# Patient Record
Sex: MALE | Race: WHITE | URBAN - METROPOLITAN AREA
[De-identification: names, ages, dates, MRNs, and addresses within clinical notes are randomized per-mention and may not be internally consistent; named-entity substitution may affect disease eponyms.]

---

## 2020-07-25 ENCOUNTER — TELEPHONE ENCOUNTER (OUTPATIENT)
Dept: URBAN - METROPOLITAN AREA CLINIC 13 | Facility: CLINIC | Age: 65
End: 2020-07-25

## 2020-07-26 ENCOUNTER — TELEPHONE ENCOUNTER (OUTPATIENT)
Dept: URBAN - METROPOLITAN AREA CLINIC 13 | Facility: CLINIC | Age: 65
End: 2020-07-26

## 2020-07-26 RX ORDER — MUPIROCIN 20 MG/G
OINTMENT TOPICAL
Qty: 22 | Refills: 0 | Status: ACTIVE | COMMUNITY
Start: 2012-02-08

## 2020-07-26 RX ORDER — FENOFIBRATE 160 MG/1
TABLET ORAL
Qty: 30 | Refills: 0 | Status: ACTIVE | COMMUNITY
Start: 2012-02-08

## 2020-07-26 RX ORDER — DEXLANSOPRAZOLE 60 MG/1
CAPSULE, DELAYED RELEASE ORAL
Qty: 30 | Refills: 0 | Status: ACTIVE | COMMUNITY
Start: 2012-01-24

## 2020-07-26 RX ORDER — ATORVASTATIN CALCIUM 40 MG/1
TABLET, FILM COATED ORAL
Qty: 30 | Refills: 0 | Status: ACTIVE | COMMUNITY
Start: 2012-05-17

## 2022-05-24 ENCOUNTER — OFFICE VISIT (OUTPATIENT)
Dept: URBAN - METROPOLITAN AREA CLINIC 113 | Facility: CLINIC | Age: 67
End: 2022-05-24
Payer: MEDICARE

## 2022-05-24 VITALS
HEART RATE: 91 BPM | TEMPERATURE: 97.5 F | WEIGHT: 148 LBS | DIASTOLIC BLOOD PRESSURE: 84 MMHG | RESPIRATION RATE: 18 BRPM | SYSTOLIC BLOOD PRESSURE: 138 MMHG | HEIGHT: 70 IN | BODY MASS INDEX: 21.19 KG/M2

## 2022-05-24 DIAGNOSIS — R13.19 ESOPHAGEAL DYSPHAGIA: ICD-10-CM

## 2022-05-24 DIAGNOSIS — K21.9 GASTROESOPHAGEAL REFLUX DISEASE, UNSPECIFIED WHETHER ESOPHAGITIS PRESENT: ICD-10-CM

## 2022-05-24 PROCEDURE — 99244 OFF/OP CNSLTJ NEW/EST MOD 40: CPT | Performed by: INTERNAL MEDICINE

## 2022-05-24 PROCEDURE — 99204 OFFICE O/P NEW MOD 45 MIN: CPT | Performed by: INTERNAL MEDICINE

## 2022-05-24 RX ORDER — HYDROCODONE BITARTRATE AND ACETAMINOPHEN 7.5; 325 MG/1; MG/1
1 TABLET AS NEEDED TABLET ORAL
Status: ACTIVE | COMMUNITY

## 2022-05-24 RX ORDER — DEXLANSOPRAZOLE 60 MG/1
CAPSULE, DELAYED RELEASE ORAL
Qty: 30 | Refills: 0 | Status: ACTIVE | COMMUNITY
Start: 2012-01-24

## 2022-05-24 RX ORDER — FENOFIBRATE 160 MG/1
TABLET ORAL
Qty: 30 | Refills: 0 | Status: ON HOLD | COMMUNITY
Start: 2012-02-08

## 2022-05-24 RX ORDER — PANTOPRAZOLE SODIUM 40 MG/1
1 TABLET TABLET, DELAYED RELEASE ORAL TWICE PER DAY
Qty: 60 | Refills: 3 | OUTPATIENT

## 2022-05-24 RX ORDER — ATORVASTATIN CALCIUM 40 MG/1
TABLET, FILM COATED ORAL
Qty: 30 | Refills: 0 | Status: ACTIVE | COMMUNITY
Start: 2012-05-17

## 2022-05-24 RX ORDER — MUPIROCIN 20 MG/G
OINTMENT TOPICAL
Qty: 22 | Refills: 0 | Status: ON HOLD | COMMUNITY
Start: 2012-02-08

## 2022-05-24 RX ORDER — CLOPIDOGREL 75 MG/1
1 TABLET TABLET, FILM COATED ORAL
Status: ACTIVE | COMMUNITY

## 2022-05-24 NOTE — HPI-TODAY'S VISIT:
The patient was referred by Dr. William Mireles for GERD .   A copy of this document is being forwarded to the referring provider.  66-year-old male presenting with a primary complaint of GERD. He is on Dexilant 60mg/d He has been getting worse GERD symptoms over the past year. He has "dealt with it". He has been taking Dexilant PRN. He does not take anything on a daily basis. He has worse symptoms when lying down. He has not had any weight loss. He does sometimes have dysphagia since his neck surgery. He has never had an EGD or colonoscopy.

## 2022-06-08 ENCOUNTER — CLAIMS CREATED FROM THE CLAIM WINDOW (OUTPATIENT)
Dept: URBAN - METROPOLITAN AREA CLINIC 4 | Facility: CLINIC | Age: 67
End: 2022-06-08
Payer: MEDICARE

## 2022-06-08 ENCOUNTER — OFFICE VISIT (OUTPATIENT)
Dept: URBAN - METROPOLITAN AREA SURGERY CENTER 25 | Facility: SURGERY CENTER | Age: 67
End: 2022-06-08
Payer: MEDICARE

## 2022-06-08 DIAGNOSIS — K29.50 CHRONIC GASTRITIS: ICD-10-CM

## 2022-06-08 DIAGNOSIS — K31.89 GASTRIC FOVEOLAR HYPERPLASIA: ICD-10-CM

## 2022-06-08 DIAGNOSIS — K31.A0 GASTRIC INTESTINAL METAPLASIA, UNSPECIFIED: ICD-10-CM

## 2022-06-08 DIAGNOSIS — R13.10 DYSPHAGIA: ICD-10-CM

## 2022-06-08 DIAGNOSIS — K31.89 OTHER DISEASES OF STOMACH AND DUODENUM: ICD-10-CM

## 2022-06-08 PROCEDURE — 88342 IMHCHEM/IMCYTCHM 1ST ANTB: CPT | Performed by: PATHOLOGY

## 2022-06-08 PROCEDURE — 43239 EGD BIOPSY SINGLE/MULTIPLE: CPT | Performed by: INTERNAL MEDICINE

## 2022-06-08 PROCEDURE — G8907 PT DOC NO EVENTS ON DISCHARG: HCPCS | Performed by: INTERNAL MEDICINE

## 2022-06-08 PROCEDURE — 88305 TISSUE EXAM BY PATHOLOGIST: CPT | Performed by: PATHOLOGY

## 2022-06-08 RX ORDER — CLOPIDOGREL 75 MG/1
1 TABLET TABLET, FILM COATED ORAL
Status: ACTIVE | COMMUNITY

## 2022-06-08 RX ORDER — PANTOPRAZOLE SODIUM 40 MG/1
1 TABLET TABLET, DELAYED RELEASE ORAL TWICE PER DAY
Qty: 60 | Refills: 3 | Status: ACTIVE | COMMUNITY

## 2022-06-08 RX ORDER — ATORVASTATIN CALCIUM 40 MG/1
TABLET, FILM COATED ORAL
Qty: 30 | Refills: 0 | Status: ACTIVE | COMMUNITY
Start: 2012-05-17

## 2022-06-08 RX ORDER — FENOFIBRATE 160 MG/1
TABLET ORAL
Qty: 30 | Refills: 0 | Status: ON HOLD | COMMUNITY
Start: 2012-02-08

## 2022-06-08 RX ORDER — MUPIROCIN 20 MG/G
OINTMENT TOPICAL
Qty: 22 | Refills: 0 | Status: ON HOLD | COMMUNITY
Start: 2012-02-08

## 2022-06-08 RX ORDER — DEXLANSOPRAZOLE 60 MG/1
CAPSULE, DELAYED RELEASE ORAL
Qty: 30 | Refills: 0 | Status: ACTIVE | COMMUNITY
Start: 2012-01-24

## 2022-06-08 RX ORDER — HYDROCODONE BITARTRATE AND ACETAMINOPHEN 7.5; 325 MG/1; MG/1
1 TABLET AS NEEDED TABLET ORAL
Status: ACTIVE | COMMUNITY

## 2022-06-17 PROBLEM — 40739000 DYSPHAGIA: Status: ACTIVE | Noted: 2022-06-17

## 2022-06-17 PROBLEM — 8493009 CHRONIC GASTRITIS: Status: ACTIVE | Noted: 2022-06-17

## 2022-06-29 ENCOUNTER — OFFICE VISIT (OUTPATIENT)
Dept: URBAN - METROPOLITAN AREA CLINIC 113 | Facility: CLINIC | Age: 67
End: 2022-06-29

## 2022-06-29 RX ORDER — HYDROCODONE BITARTRATE AND ACETAMINOPHEN 7.5; 325 MG/1; MG/1
1 TABLET AS NEEDED TABLET ORAL
Status: ON HOLD | COMMUNITY

## 2022-06-29 RX ORDER — PANTOPRAZOLE SODIUM 40 MG/1
1 TABLET TABLET, DELAYED RELEASE ORAL TWICE PER DAY
Qty: 60 | Refills: 3 | Status: ON HOLD | COMMUNITY

## 2022-06-29 RX ORDER — MUPIROCIN 20 MG/G
OINTMENT TOPICAL
Qty: 22 | Refills: 0 | Status: ON HOLD | COMMUNITY
Start: 2012-02-08

## 2022-06-29 RX ORDER — FENOFIBRATE 160 MG/1
TABLET ORAL
Qty: 30 | Refills: 0 | Status: ACTIVE | COMMUNITY
Start: 2012-02-08

## 2022-06-29 RX ORDER — DEXLANSOPRAZOLE 60 MG/1
CAPSULE, DELAYED RELEASE ORAL
Qty: 30 | Refills: 0 | Status: ACTIVE | COMMUNITY
Start: 2012-01-24

## 2022-06-29 RX ORDER — CLOPIDOGREL 75 MG/1
1 TABLET TABLET, FILM COATED ORAL
Status: ON HOLD | COMMUNITY

## 2022-06-29 RX ORDER — ATORVASTATIN CALCIUM 40 MG/1
TABLET, FILM COATED ORAL
Qty: 30 | Refills: 0 | Status: ON HOLD | COMMUNITY
Start: 2012-05-17

## 2022-08-22 ENCOUNTER — OFFICE VISIT (OUTPATIENT)
Dept: URBAN - METROPOLITAN AREA CLINIC 113 | Facility: CLINIC | Age: 67
End: 2022-08-22
Payer: MEDICARE

## 2022-08-22 ENCOUNTER — WEB ENCOUNTER (OUTPATIENT)
Dept: URBAN - METROPOLITAN AREA CLINIC 113 | Facility: CLINIC | Age: 67
End: 2022-08-22

## 2022-08-22 VITALS
HEART RATE: 67 BPM | WEIGHT: 147 LBS | BODY MASS INDEX: 21.05 KG/M2 | DIASTOLIC BLOOD PRESSURE: 67 MMHG | TEMPERATURE: 97.3 F | HEIGHT: 70 IN | RESPIRATION RATE: 14 BRPM | SYSTOLIC BLOOD PRESSURE: 133 MMHG

## 2022-08-22 DIAGNOSIS — Z12.11 COLON CANCER SCREENING: ICD-10-CM

## 2022-08-22 DIAGNOSIS — R13.19 ESOPHAGEAL DYSPHAGIA: ICD-10-CM

## 2022-08-22 DIAGNOSIS — K21.9 GASTROESOPHAGEAL REFLUX DISEASE, UNSPECIFIED WHETHER ESOPHAGITIS PRESENT: ICD-10-CM

## 2022-08-22 PROBLEM — 235595009: Status: ACTIVE | Noted: 2022-05-24

## 2022-08-22 PROCEDURE — 99213 OFFICE O/P EST LOW 20 MIN: CPT

## 2022-08-22 RX ORDER — DEXLANSOPRAZOLE 60 MG/1
CAPSULE, DELAYED RELEASE ORAL
OUTPATIENT
Start: 2012-01-24

## 2022-08-22 RX ORDER — MUPIROCIN 20 MG/G
OINTMENT TOPICAL
Qty: 22 | Refills: 0 | Status: ON HOLD | COMMUNITY
Start: 2012-02-08

## 2022-08-22 RX ORDER — PANTOPRAZOLE SODIUM 40 MG/1
1 TABLET TABLET, DELAYED RELEASE ORAL TWICE PER DAY
Qty: 60 | Refills: 3 | Status: ON HOLD | COMMUNITY

## 2022-08-22 RX ORDER — CLOPIDOGREL 75 MG/1
1 TABLET TABLET, FILM COATED ORAL ONCE A DAY
Status: ACTIVE | COMMUNITY

## 2022-08-22 RX ORDER — FENOFIBRATE 160 MG/1
TABLET ORAL
Qty: 30 | Refills: 0 | Status: ON HOLD | COMMUNITY
Start: 2012-02-08

## 2022-08-22 RX ORDER — ATORVASTATIN CALCIUM 40 MG/1
TABLET, FILM COATED ORAL
Qty: 30 | Refills: 0 | Status: ON HOLD | COMMUNITY
Start: 2012-05-17

## 2022-08-22 RX ORDER — CLOPIDOGREL 75 MG/1
1 TABLET TABLET, FILM COATED ORAL
Status: ON HOLD | COMMUNITY

## 2022-08-22 RX ORDER — HYDROCODONE BITARTRATE AND ACETAMINOPHEN 7.5; 325 MG/1; MG/1
1 TABLET AS NEEDED TABLET ORAL
Status: ACTIVE | COMMUNITY

## 2022-08-22 RX ORDER — DEXLANSOPRAZOLE 60 MG/1
CAPSULE, DELAYED RELEASE ORAL
Qty: 30 | Refills: 0 | Status: ACTIVE | COMMUNITY
Start: 2012-01-24

## 2022-08-22 RX ORDER — HYDROCODONE BITARTRATE AND ACETAMINOPHEN 7.5; 325 MG/1; MG/1
1 TABLET AS NEEDED TABLET ORAL
Status: ON HOLD | COMMUNITY

## 2022-08-22 NOTE — HPI-TODAY'S VISIT:
66-year-old male with a history of GERD, hyperlipidemia, and anticoagulation use presents for follow-up after EGD.  He was last seen on 5/24/2022 as a referral from Dr. William Mireles for GERD.  He reported worsening GERD over the past year despite as needed use of Dexilant.  Symptoms worsened at night, and he experienced occasional dysphagia following his neck surgery.  He was planned for an EGD with possible dilation and started on pantoprazole 40 mg twice daily.  He was asked to hold Plavix 7 days prior to procedure.  EGD 6/8/2022:Performed without difficulty.  No endoscopic esophageal abnormality to explain patient's dysphagia.  Duodenum was normal.  Gastritis was biopsied.  Pathology chronic inactive gastritis with changes suggestive of treated H pylori gastritis. Foveolar hyperplasia. No evidence of active H pylori.  Patient was diagnosed with left MCA CVA s/p emergent thrombectomy. He was started on ASA/Plavix and underwent ST/OT. His cardiologist is Dr. Thomas. Dysphagia and GERD is well controlled with Dexilant 60 mg once daily. He denies nausea, vomiting, abdominal pain. Bowel movements regular and without blood per rectum. He has never had a colonoscopy. No family history of colon cancer.

## 2022-09-07 NOTE — HPI-TODAY'S VISIT:
66-year-old male with a history of GERD, hyperlipidemia, and anticoagulation use presents for follow-up after EGD.  He was last seen on 5/24/2022 as a referral from Dr. William Mireles for GERD.  He reported worsening GERD over the past year despite as needed use of Dexilant.  Symptoms worsened at night and he experienced occasional dysphagia following his neck surgery.  He was planned for an EGD with possible dilation and started on pantoprazole 40 mg twice daily.  He was asked to hold Plavix 7 days prior to procedure. EGD 6/8/2022:Performed without difficulty.  No endoscopic esophageal abnormality to explain patient's dysphagia.  Duodenum was normal.  Gastritis was biopsied.  We do not have pathology.  Libtayo Counseling- I discussed with the patient the risks of Libtayo including but not limited to nausea, vomiting, diarrhea, and bone or muscle pain.  The patient verbalized understanding of the proper use and possible adverse effects of Libtayo.  All of the patient's questions and concerns were addressed.

## 2022-11-21 ENCOUNTER — ERX REFILL RESPONSE (OUTPATIENT)
Dept: URBAN - METROPOLITAN AREA CLINIC 107 | Facility: CLINIC | Age: 67
End: 2022-11-21

## 2022-11-21 RX ORDER — PANTOPRAZOLE SODIUM 40 MG/1
1 TABLET TABLET, DELAYED RELEASE ORAL TWICE PER DAY
Qty: 60 | Refills: 3 | OUTPATIENT

## 2022-11-21 RX ORDER — PANTOPRAZOLE SODIUM 40 MG/1
TAKE ONE TABLET BY MOUTH TWICE DAILY TABLET, DELAYED RELEASE ORAL
Qty: 60 TABLET | Refills: 3 | OUTPATIENT

## 2023-04-03 ENCOUNTER — OFFICE VISIT (OUTPATIENT)
Dept: URBAN - METROPOLITAN AREA CLINIC 113 | Facility: CLINIC | Age: 68
End: 2023-04-03
Payer: MEDICARE

## 2023-04-03 ENCOUNTER — WEB ENCOUNTER (OUTPATIENT)
Dept: URBAN - METROPOLITAN AREA CLINIC 113 | Facility: CLINIC | Age: 68
End: 2023-04-03

## 2023-04-03 VITALS
DIASTOLIC BLOOD PRESSURE: 75 MMHG | RESPIRATION RATE: 14 BRPM | SYSTOLIC BLOOD PRESSURE: 117 MMHG | TEMPERATURE: 97.3 F | WEIGHT: 125 LBS | HEART RATE: 66 BPM | BODY MASS INDEX: 17.9 KG/M2 | HEIGHT: 70 IN

## 2023-04-03 DIAGNOSIS — K86.9 PANCREATIC LESION: ICD-10-CM

## 2023-04-03 DIAGNOSIS — R63.4 UNINTENTIONAL WEIGHT LOSS: ICD-10-CM

## 2023-04-03 DIAGNOSIS — K21.9 GASTROESOPHAGEAL REFLUX DISEASE, UNSPECIFIED WHETHER ESOPHAGITIS PRESENT: ICD-10-CM

## 2023-04-03 DIAGNOSIS — R13.19 ESOPHAGEAL DYSPHAGIA: ICD-10-CM

## 2023-04-03 DIAGNOSIS — Z12.11 COLON CANCER SCREENING: ICD-10-CM

## 2023-04-03 DIAGNOSIS — R93.5 ABNORMAL CT OF THE ABDOMEN: ICD-10-CM

## 2023-04-03 PROCEDURE — 99214 OFFICE O/P EST MOD 30 MIN: CPT

## 2023-04-03 RX ORDER — MUPIROCIN 20 MG/G
OINTMENT TOPICAL
Qty: 22 | Refills: 0 | Status: ON HOLD | COMMUNITY
Start: 2012-02-08

## 2023-04-03 RX ORDER — ATORVASTATIN CALCIUM 40 MG/1
TABLET, FILM COATED ORAL
Qty: 30 | Refills: 0 | Status: ON HOLD | COMMUNITY
Start: 2012-05-17

## 2023-04-03 RX ORDER — PANTOPRAZOLE SODIUM 40 MG/1
TAKE ONE TABLET BY MOUTH TWICE DAILY TABLET, DELAYED RELEASE ORAL
Qty: 60 TABLET | Refills: 3 | Status: ACTIVE | COMMUNITY

## 2023-04-03 RX ORDER — DEXLANSOPRAZOLE 60 MG/1
CAPSULE, DELAYED RELEASE ORAL
OUTPATIENT

## 2023-04-03 RX ORDER — FENOFIBRATE 160 MG/1
TABLET ORAL
Qty: 30 | Refills: 0 | Status: ON HOLD | COMMUNITY
Start: 2012-02-08

## 2023-04-03 RX ORDER — HYDROCODONE BITARTRATE AND ACETAMINOPHEN 7.5; 325 MG/1; MG/1
1 TABLET AS NEEDED TABLET ORAL
Status: ON HOLD | COMMUNITY

## 2023-04-03 RX ORDER — CLOPIDOGREL 75 MG/1
1 TABLET TABLET, FILM COATED ORAL ONCE A DAY
Status: ACTIVE | COMMUNITY

## 2023-04-03 RX ORDER — DEXLANSOPRAZOLE 60 MG/1
CAPSULE, DELAYED RELEASE ORAL
Status: ACTIVE | COMMUNITY
Start: 2012-01-24

## 2023-04-03 RX ORDER — CLOPIDOGREL 75 MG/1
1 TABLET TABLET, FILM COATED ORAL
Status: ON HOLD | COMMUNITY

## 2023-04-03 RX ORDER — HYDROCODONE BITARTRATE AND ACETAMINOPHEN 7.5; 325 MG/1; MG/1
1 TABLET AS NEEDED TABLET ORAL
Status: ACTIVE | COMMUNITY

## 2023-04-03 NOTE — HPI-TODAY'S VISIT:
67-year-old male presents for follow-up.  He was last seen on 8/22/2022.  EGD performed for worsening GERD and intermittent dysphagia was unremarkable with exception of chronic inactive gastritis.  Symptoms had resolved with consistent use of Dexilant 60 mg once daily.  He was to continue this.  He was overdue for colon cancer screening as he had never had a colonoscopy.  However, he did recently have a stroke and a colonoscopy was deferred until least 6 months.  We would rediscuss at follow-up.   He presented to the ED in January 2023 with acute occlusion of the left popliteal artery and underwent angiographic thrombectomy on 1/20/2023.  He developed hypotension/shock postoperatively and a CT angiogram showed a large left retroperitoneal hematoma with active bleeding along the iliac muscle arising from the left deep circumflex iliac.  IR performed emergent coil embolization of the left deep circumflex iliac artery with successful cessation of the hemorrhage..  He required massive transfusion protocol 5 units of PRBC.  He developed a NSTEMI, ALBAN and acute hypoxic respiratory failure during the event.  Cardiology in consultation elected to perform heart catheterization which turned out to be absent of new disease findings.  CT angiogram abdomen/pelvis 1/202/2023: normal liver, gallbladder, and spleen. Focal 1.3 x 0.9 cm hyperenhancing focus within the distal pancreatic tail. Small hiatal hernia. Large left retroperitoneal hematoma with active hemorrhage along the ilias muscle, felt to arise from the branches of the left deep circumflex iliac artery. Diffuse aortoiliac atherosclerosis with severe stenosis of the distal left external iliac/proximal left common femoral artery just distal to endograft. There is complete reconstitution just distally at the common femoral artery stent. Infrarenal abdominal aortic aneurysm measuring up to 3 cm stable from prior exam. Tree-in-bud nodularity within the right lower love with endobronchial debris, new from prior exam, which may reflect chronic infectious/inflammatory process and/or aspiration.   He is followed by Dr. Thomas. He has not seen him since the hospitalization. He has 20-15 pounds since his hospitalization. His appetite is normal. He eats two meals per day, yet he is losing weight.  He is followed by pain management. He is to be started on a prescription for his opioid induced constipation. He is waiting for the prescription. He denies abdominal pain, nausea or vomiting. Denies fevers or chills. Denies blood per rectum or melena. Reflux is well controlled Dexilant 60 mg once daily.   8/22/22 66-year-old male with a history of GERD, hyperlipidemia, and anticoagulation use presents for follow-up after EGD.  He was last seen on 5/24/2022 as a referral from Dr. William Mireles for GERD.  He reported worsening GERD over the past year despite as needed use of Dexilant.  Symptoms worsened at night, and he experienced occasional dysphagia following his neck surgery.  He was planned for an EGD with possible dilation and started on pantoprazole 40 mg twice daily.  He was asked to hold Plavix 7 days prior to procedure.  EGD 6/8/2022:Performed without difficulty.  No endoscopic esophageal abnormality to explain patient's dysphagia.  Duodenum was normal.  Gastritis was biopsied.  Pathology chronic inactive gastritis with changes suggestive of treated H pylori gastritis. Foveolar hyperplasia. No evidence of active H pylori.  Patient was diagnosed with left MCA CVA s/p emergent thrombectomy. He was started on ASA/Plavix and underwent ST/OT. His cardiologist is Dr. Thomas. Dysphagia and GERD is well controlled with Dexilant 60 mg once daily. He denies nausea, vomiting, abdominal pain. Bowel movements regular and without blood per rectum. He has never had a colonoscopy. No family history of colon cancer.

## 2023-04-04 LAB
A/G RATIO: 1.5
ABSOLUTE BASOPHILS: 88
ABSOLUTE EOSINOPHILS: 440
ABSOLUTE LYMPHOCYTES: 3564
ABSOLUTE MONOCYTES: 651
ABSOLUTE NEUTROPHILS: 4057
ALBUMIN: 4
ALKALINE PHOSPHATASE: 63
ALT (SGPT): 7
AST (SGOT): 12
BASOPHILS: 1
BILIRUBIN, TOTAL: 0.7
BUN/CREATININE RATIO: (no result)
BUN: 7
C-REACTIVE PROTEIN, QUANT: 19.4
CALCIUM: 9.3
CARBON DIOXIDE, TOTAL: 33
CHLORIDE: 98
CREATININE: 0.77
EGFR: 99
EOSINOPHILS: 5
GLOBULIN, TOTAL: 2.7
GLUCOSE: 82
HEMATOCRIT: 39.8
HEMOGLOBIN: 13.8
LYMPHOCYTES: 40.5
MCH: 30.5
MCHC: 34.7
MCV: 87.9
MONOCYTES: 7.4
MPV: 11.5
NEUTROPHILS: 46.1
PLATELET COUNT: 199
POTASSIUM: 3
PROTEIN, TOTAL: 6.7
RDW: 15.2
RED BLOOD CELL COUNT: 4.53
SED RATE BY MODIFIED: 2
SODIUM: 138
WHITE BLOOD CELL COUNT: 8.8

## 2023-04-24 ENCOUNTER — TELEPHONE ENCOUNTER (OUTPATIENT)
Dept: URBAN - METROPOLITAN AREA CLINIC 113 | Facility: CLINIC | Age: 68
End: 2023-04-24

## 2023-04-28 PROBLEM — 70342003: Status: ACTIVE | Noted: 2023-04-28

## 2023-05-11 ENCOUNTER — OFFICE VISIT (OUTPATIENT)
Dept: URBAN - METROPOLITAN AREA CLINIC 113 | Facility: CLINIC | Age: 68
End: 2023-05-11
Payer: MEDICARE

## 2023-05-11 VITALS
HEIGHT: 70 IN | HEART RATE: 68 BPM | DIASTOLIC BLOOD PRESSURE: 58 MMHG | WEIGHT: 124.4 LBS | TEMPERATURE: 97.3 F | BODY MASS INDEX: 17.81 KG/M2 | SYSTOLIC BLOOD PRESSURE: 94 MMHG | RESPIRATION RATE: 18 BRPM

## 2023-05-11 DIAGNOSIS — R63.4 UNINTENTIONAL WEIGHT LOSS: ICD-10-CM

## 2023-05-11 DIAGNOSIS — R93.2 ABNORMAL FINDINGS ON DIAGNOSTIC IMAGING OF GALLBLADDER: ICD-10-CM

## 2023-05-11 DIAGNOSIS — K80.20 CALCULUS OF GALLBLADDER WITHOUT CHOLECYSTITIS WITHOUT OBSTRUCTION: ICD-10-CM

## 2023-05-11 DIAGNOSIS — K21.9 GASTROESOPHAGEAL REFLUX DISEASE, UNSPECIFIED WHETHER ESOPHAGITIS PRESENT: ICD-10-CM

## 2023-05-11 PROCEDURE — 99214 OFFICE O/P EST MOD 30 MIN: CPT

## 2023-05-11 RX ORDER — DEXLANSOPRAZOLE 60 MG/1
CAPSULE, DELAYED RELEASE ORAL
Status: ACTIVE | COMMUNITY

## 2023-05-11 RX ORDER — CLOPIDOGREL 75 MG/1
1 TABLET TABLET, FILM COATED ORAL
Status: ON HOLD | COMMUNITY

## 2023-05-11 RX ORDER — PANTOPRAZOLE SODIUM 40 MG/1
TAKE ONE TABLET BY MOUTH TWICE DAILY TABLET, DELAYED RELEASE ORAL
Qty: 60 TABLET | Refills: 3 | Status: ACTIVE | COMMUNITY

## 2023-05-11 RX ORDER — CLOPIDOGREL 75 MG/1
1 TABLET TABLET, FILM COATED ORAL ONCE A DAY
Status: ACTIVE | COMMUNITY

## 2023-05-11 RX ORDER — MUPIROCIN 20 MG/G
OINTMENT TOPICAL
Qty: 22 | Refills: 0 | Status: ON HOLD | COMMUNITY
Start: 2012-02-08

## 2023-05-11 RX ORDER — HYDROCODONE BITARTRATE AND ACETAMINOPHEN 7.5; 325 MG/1; MG/1
1 TABLET AS NEEDED TABLET ORAL
Status: ACTIVE | COMMUNITY

## 2023-05-11 RX ORDER — HYDROCODONE BITARTRATE AND ACETAMINOPHEN 7.5; 325 MG/1; MG/1
1 TABLET AS NEEDED TABLET ORAL
Status: ON HOLD | COMMUNITY

## 2023-05-11 RX ORDER — FENOFIBRATE 160 MG/1
TABLET ORAL
Qty: 30 | Refills: 0 | Status: ON HOLD | COMMUNITY
Start: 2012-02-08

## 2023-05-11 RX ORDER — ATORVASTATIN CALCIUM 40 MG/1
TABLET, FILM COATED ORAL
Qty: 30 | Refills: 0 | Status: ON HOLD | COMMUNITY
Start: 2012-05-17

## 2023-05-11 RX ORDER — DEXLANSOPRAZOLE 60 MG/1
CAPSULE, DELAYED RELEASE ORAL
OUTPATIENT

## 2023-05-11 NOTE — HPI-TODAY'S VISIT:
Review 67-year-old male presents for short interval follow-up.  He was last seen on 4/3/2023.  He is due for screening colonoscopy however this was originally deferred due to prior CVA in 2022.  He had since been hospitalized for popliteal artery clotting followed by retroperitoneal hematoma status postembolization.  This was complicated by NSTEMI and ALBAN.  Given his recent cardiovascular events he was again referred for colonoscopy for at least 3 to 6 months.  Cardiology follow-up was encouraged.  CT angiogram was notable for pancreatic lesion.  Given his unintentional weight loss and findings he was planned for an MRI.  Based on these findings would consider upper GI series.  EGD again is not recommended given comorbidities.  Labs 4/3/2023:Normal sed rate, CRP 19.4, potassium 3, normal LFTs, unremarkable CBC.  MRI of the abdomen with and without contrast 4/18/2023:No evidence for pancreatic mass.  Cholelithiasis with mild gallbladder wall thickening measuring just over 3 mm and mild dilatation of the biliary tree without evidence of choledocholithiasis.  Recommend correlation with liver function test.  There is a 99 x 72 mm complex collection in the left lower retroperitoneum lateral to the psoas muscle.  It is T1 shortening with no evidence of enhancement is most consistent with a hematoma.  Correlate clinically.  Small right pleural effusion.  Mild enlargement of the infrarenal aorta measuring 31 mm.  He was recommended to follow-up with his primary care regarding the large hematoma.  Regarding his gallstone findings, most recent LFTs were normal though he was asked if he had any right upper quadrant pain.  He admitted to intermittent right upper quadrant pain sometimes associated with food.  He was referred to Dr. Lopez to determine candidacy for surgery.   He is drinking boost shakes 4-5 times per day. He was switched from Paxil to Lexapro. He did have nausea during today's visit. He is not sure if this is from Paxil. He has not had nausea issues his last visit. He denies abdominal pain. Denies diarrhea or rectal bleeding. His appetite is healthy. He is not sure why he is not gaining weight. He did see Dr. Lopez yesterday who did not feel he was a candidate for surgery at this time.  4/3/2023 67-year-old male presents for follow-up.  He was last seen on 8/22/2022.  EGD performed for worsening GERD and intermittent dysphagia was unremarkable with exception of chronic inactive gastritis.  Symptoms had resolved with consistent use of Dexilant 60 mg once daily.  He was to continue this.  He was overdue for colon cancer screening as he had never had a colonoscopy.  However, he did recently have a stroke and a colonoscopy was deferred until least 6 months.  We would rediscuss at follow-up.   He presented to the ED in January 2023 with acute occlusion of the left popliteal artery and underwent angiographic thrombectomy on 1/20/2023.  He developed hypotension/shock postoperatively and a CT angiogram showed a large left retroperitoneal hematoma with active bleeding along the iliac muscle arising from the left deep circumflex iliac.  IR performed emergent coil embolization of the left deep circumflex iliac artery with successful cessation of the hemorrhage..  He required massive transfusion protocol 5 units of PRBC.  He developed a NSTEMI, ALBAN and acute hypoxic respiratory failure during the event.  Cardiology in consultation elected to perform heart catheterization which turned out to be absent of new disease findings.  CT angiogram abdomen/pelvis 1/202/2023: normal liver, gallbladder, and spleen. Focal 1.3 x 0.9 cm hyperenhancing focus within the distal pancreatic tail. Small hiatal hernia. Large left retroperitoneal hematoma with active hemorrhage along the ilias muscle, felt to arise from the branches of the left deep circumflex iliac artery. Diffuse aortoiliac atherosclerosis with severe stenosis of the distal left external iliac/proximal left common femoral artery just distal to endograft. There is complete reconstitution just distally at the common femoral artery stent. Infrarenal abdominal aortic aneurysm measuring up to 3 cm stable from prior exam. Tree-in-bud nodularity within the right lower love with endobronchial debris, new from prior exam, which may reflect chronic infectious/inflammatory process and/or aspiration.   He is followed by Dr. Thomas. He has not seen him since the hospitalization. He has 20-15 pounds since his hospitalization. His appetite is normal. He eats two meals per day, yet he is losing weight.  He is followed by pain management. He is to be started on a prescription for his opioid induced constipation. He is waiting for the prescription. He denies abdominal pain, nausea or vomiting. Denies fevers or chills. Denies blood per rectum or melena. Reflux is well controlled Dexilant 60 mg once daily.   8/22/22 66-year-old male with a history of GERD, hyperlipidemia, and anticoagulation use presents for follow-up after EGD.  He was last seen on 5/24/2022 as a referral from Dr. William Mireles for GERD.  He reported worsening GERD over the past year despite as needed use of Dexilant.  Symptoms worsened at night, and he experienced occasional dysphagia following his neck surgery.  He was planned for an EGD with possible dilation and started on pantoprazole 40 mg twice daily.  He was asked to hold Plavix 7 days prior to procedure.  EGD 6/8/2022:Performed without difficulty.  No endoscopic esophageal abnormality to explain patient's dysphagia.  Duodenum was normal.  Gastritis was biopsied.  Pathology chronic inactive gastritis with changes suggestive of treated H pylori gastritis. Foveolar hyperplasia. No evidence of active H pylori.  Patient was diagnosed with left MCA CVA s/p emergent thrombectomy. He was started on ASA/Plavix and underwent ST/OT. His cardiologist is Dr. Thomas. Dysphagia and GERD is well controlled with Dexilant 60 mg once daily. He denies nausea, vomiting, abdominal pain. Bowel movements regular and without blood per rectum. He has never had a colonoscopy. No family history of colon cancer.

## 2023-07-03 ENCOUNTER — OFFICE VISIT (OUTPATIENT)
Dept: URBAN - METROPOLITAN AREA CLINIC 113 | Facility: CLINIC | Age: 68
End: 2023-07-03

## 2023-07-03 ENCOUNTER — OFFICE VISIT (OUTPATIENT)
Dept: URBAN - METROPOLITAN AREA CLINIC 113 | Facility: CLINIC | Age: 68
End: 2023-07-03
Payer: MEDICARE

## 2023-07-03 VITALS
SYSTOLIC BLOOD PRESSURE: 105 MMHG | BODY MASS INDEX: 18.27 KG/M2 | WEIGHT: 127.6 LBS | TEMPERATURE: 97.1 F | DIASTOLIC BLOOD PRESSURE: 61 MMHG | RESPIRATION RATE: 18 BRPM | HEART RATE: 77 BPM | HEIGHT: 70 IN

## 2023-07-03 DIAGNOSIS — R63.4 UNINTENTIONAL WEIGHT LOSS: ICD-10-CM

## 2023-07-03 DIAGNOSIS — R10.11 RUQ PAIN: ICD-10-CM

## 2023-07-03 DIAGNOSIS — K86.9 PANCREATIC LESION: ICD-10-CM

## 2023-07-03 DIAGNOSIS — K80.20 CALCULUS OF GALLBLADDER WITHOUT CHOLECYSTITIS WITHOUT OBSTRUCTION: ICD-10-CM

## 2023-07-03 DIAGNOSIS — Z12.11 COLON CANCER SCREENING: ICD-10-CM

## 2023-07-03 DIAGNOSIS — R93.5 ABNORMAL CT OF THE ABDOMEN: ICD-10-CM

## 2023-07-03 DIAGNOSIS — R13.19 ESOPHAGEAL DYSPHAGIA: ICD-10-CM

## 2023-07-03 DIAGNOSIS — K21.9 GASTROESOPHAGEAL REFLUX DISEASE, UNSPECIFIED WHETHER ESOPHAGITIS PRESENT: ICD-10-CM

## 2023-07-03 DIAGNOSIS — K82.8 THICKENING OF WALL OF GALLBLADDER: ICD-10-CM

## 2023-07-03 DIAGNOSIS — R93.2 ABNORMAL FINDINGS ON DIAGNOSTIC IMAGING OF GALLBLADDER: ICD-10-CM

## 2023-07-03 PROCEDURE — 99214 OFFICE O/P EST MOD 30 MIN: CPT | Performed by: INTERNAL MEDICINE

## 2023-07-03 RX ORDER — SODIUM SULFATE, POTASSIUM SULFATE, MAGNESIUM SULFATE 1.6; 3.13; 17.5 G/177ML; G/177ML; G/177ML
177ML SOLUTION ORAL
Qty: 708 | OUTPATIENT
Start: 2023-07-03 | End: 2023-07-05

## 2023-07-03 RX ORDER — HYDROCODONE BITARTRATE AND ACETAMINOPHEN 7.5; 325 MG/1; MG/1
1 TABLET AS NEEDED TABLET ORAL
Status: ON HOLD | COMMUNITY

## 2023-07-03 RX ORDER — MUPIROCIN 20 MG/G
OINTMENT TOPICAL
Qty: 22 | Refills: 0 | Status: ON HOLD | COMMUNITY
Start: 2012-02-08

## 2023-07-03 RX ORDER — PANTOPRAZOLE SODIUM 40 MG/1
TAKE ONE TABLET BY MOUTH TWICE DAILY TABLET, DELAYED RELEASE ORAL
Qty: 60 TABLET | Refills: 3 | Status: ACTIVE | COMMUNITY

## 2023-07-03 RX ORDER — CLOPIDOGREL 75 MG/1
1 TABLET TABLET, FILM COATED ORAL ONCE A DAY
Status: ACTIVE | COMMUNITY

## 2023-07-03 RX ORDER — CLOPIDOGREL 75 MG/1
1 TABLET TABLET, FILM COATED ORAL
Status: ON HOLD | COMMUNITY

## 2023-07-03 RX ORDER — ATORVASTATIN CALCIUM 40 MG/1
TABLET, FILM COATED ORAL
Qty: 30 | Refills: 0 | Status: ON HOLD | COMMUNITY
Start: 2012-05-17

## 2023-07-03 RX ORDER — HYDROCODONE BITARTRATE AND ACETAMINOPHEN 7.5; 325 MG/1; MG/1
1 TABLET AS NEEDED TABLET ORAL
Status: ACTIVE | COMMUNITY

## 2023-07-03 RX ORDER — FENOFIBRATE 160 MG/1
TABLET ORAL
Qty: 30 | Refills: 0 | Status: ON HOLD | COMMUNITY
Start: 2012-02-08

## 2023-07-03 RX ORDER — DEXLANSOPRAZOLE 60 MG/1
CAPSULE, DELAYED RELEASE ORAL
Status: ACTIVE | COMMUNITY

## 2023-07-03 NOTE — HPI-TODAY'S VISIT:
68 y/o male presenting for f/u. He was last seen in May 2023. He has a hx of GERD and GI bleeding. He had an EGD for dysphagia in June 2022 which was normal. He has had stress test and eval with cards which is normal. This was done with Dr. Rodriges. He has had f/u with vascular surgery for bypass towards the end of this month. He has never had a CSC in the past. We will schedule this for after his LE bypass.   5/11/23 Review 67-year-old male presents for short interval follow-up.  He was last seen on 4/3/2023.  He is due for screening colonoscopy however this was originally deferred due to prior CVA in 2022.  He had since been hospitalized for popliteal artery clotting followed by retroperitoneal hematoma status postembolization.  This was complicated by NSTEMI and ALBAN.  Given his recent cardiovascular events he was again referred for colonoscopy for at least 3 to 6 months.  Cardiology follow-up was encouraged.  CT angiogram was notable for pancreatic lesion.  Given his unintentional weight loss and findings he was planned for an MRI.  Based on these findings would consider upper GI series.  EGD again is not recommended given comorbidities.  Labs 4/3/2023:Normal sed rate, CRP 19.4, potassium 3, normal LFTs, unremarkable CBC.  MRI of the abdomen with and without contrast 4/18/2023:No evidence for pancreatic mass.  Cholelithiasis with mild gallbladder wall thickening measuring just over 3 mm and mild dilatation of the biliary tree without evidence of choledocholithiasis.  Recommend correlation with liver function test.  There is a 99 x 72 mm complex collection in the left lower retroperitoneum lateral to the psoas muscle.  It is T1 shortening with no evidence of enhancement is most consistent with a hematoma.  Correlate clinically.  Small right pleural effusion.  Mild enlargement of the infrarenal aorta measuring 31 mm.  He was recommended to follow-up with his primary care regarding the large hematoma.  Regarding his gallstone findings, most recent LFTs were normal though he was asked if he had any right upper quadrant pain.  He admitted to intermittent right upper quadrant pain sometimes associated with food.  He was referred to Dr. Lopez to determine candidacy for surgery.   He is drinking boost shakes 4-5 times per day. He was switched from Paxil to Lexapro. He did have nausea during today's visit. He is not sure if this is from [facility]. He has not had nausea issues his last visit. He denies abdominal pain. Denies diarrhea or rectal bleeding. His appetite is healthy. He is not sure why he is not gaining weight. He did see Dr. Lopez yesterday who did not feel he was a candidate for surgery at this time.  4/3/2023 67-year-old male presents for follow-up.  He was last seen on 8/22/2022.  EGD performed for worsening GERD and intermittent dysphagia was unremarkable with exception of chronic inactive gastritis.  Symptoms had resolved with consistent use of Dexilant 60 mg once daily.  He was to continue this.  He was overdue for colon cancer screening as he had never had a colonoscopy.  However, he did recently have a stroke and a colonoscopy was deferred until least 6 months.  We would rediscuss at follow-up.   He presented to the ED in January 2023 with acute occlusion of the left popliteal artery and underwent angiographic thrombectomy on 1/20/2023.  He developed hypotension/shock postoperatively and a CT angiogram showed a large left retroperitoneal hematoma with active bleeding along the iliac muscle arising from the left deep circumflex iliac.  IR performed emergent coil embolization of the left deep circumflex iliac artery with successful cessation of the hemorrhage..  He required massive transfusion protocol 5 units of PRBC.  He developed a NSTEMI, ALBAN and acute hypoxic respiratory failure during the event.  Cardiology in consultation elected to perform heart catheterization which turned out to be absent of new disease findings.  CT angiogram abdomen/pelvis 1/202/2023: normal liver, gallbladder, and spleen. Focal 1.3 x 0.9 cm hyperenhancing focus within the distal pancreatic tail. Small hiatal hernia. Large left retroperitoneal hematoma with active hemorrhage along the ilias muscle, felt to arise from the branches of the left deep circumflex iliac artery. Diffuse aortoiliac atherosclerosis with severe stenosis of the distal left external iliac/proximal left common femoral artery just distal to endograft. There is complete reconstitution just distally at the common femoral artery stent. Infrarenal abdominal aortic aneurysm measuring up to 3 cm stable from prior exam. Tree-in-bud nodularity within the right lower love with endobronchial debris, new from prior exam, which may reflect chronic infectious/inflammatory process and/or aspiration.   He is followed by Dr. Thomas. He has not seen him since the hospitalization. He has 20-15 pounds since his hospitalization. His appetite is normal. He eats two meals per day, yet he is losing weight.  He is followed by pain management. He is to be started on a prescription for his opioid induced constipation. He is waiting for the prescription. He denies abdominal pain, nausea or vomiting. Denies fevers or chills. Denies blood per rectum or melena. Reflux is well controlled Dexilant 60 mg once daily.   8/22/22 66-year-old male with a history of GERD, hyperlipidemia, and anticoagulation use presents for follow-up after EGD.  He was last seen on 5/24/2022 as a referral from Dr. William Mireles for GERD.  He reported worsening GERD over the past year despite as needed use of Dexilant.  Symptoms worsened at night, and he experienced occasional dysphagia following his neck surgery.  He was planned for an EGD with possible dilation and started on pantoprazole 40 mg twice daily.  He was asked to hold Plavix 7 days prior to procedure.  EGD 6/8/2022:Performed without difficulty.  No endoscopic esophageal abnormality to explain patient's dysphagia.  Duodenum was normal.  Gastritis was biopsied.  Pathology chronic inactive gastritis with changes suggestive of treated H pylori gastritis. Foveolar hyperplasia. No evidence of active H pylori.  Patient was diagnosed with left MCA CVA s/p emergent thrombectomy. He was started on ASA/Plavix and underwent ST/OT. His cardiologist is Dr. Thomas. Dysphagia and GERD is well controlled with Dexilant 60 mg once daily. He denies nausea, vomiting, abdominal pain. Bowel movements regular and without blood per rectum. He has never had a colonoscopy. No family history of colon cancer.

## 2023-07-10 ENCOUNTER — TELEPHONE ENCOUNTER (OUTPATIENT)
Dept: URBAN - METROPOLITAN AREA CLINIC 113 | Facility: CLINIC | Age: 68
End: 2023-07-10

## 2023-07-12 ENCOUNTER — TELEPHONE ENCOUNTER (OUTPATIENT)
Dept: URBAN - METROPOLITAN AREA CLINIC 113 | Facility: CLINIC | Age: 68
End: 2023-07-12

## 2023-07-13 ENCOUNTER — CLAIMS CREATED FROM THE CLAIM WINDOW (OUTPATIENT)
Dept: URBAN - METROPOLITAN AREA MEDICAL CENTER 43 | Facility: MEDICAL CENTER | Age: 68
End: 2023-07-13

## 2023-07-13 ENCOUNTER — CLAIMS CREATED FROM THE CLAIM WINDOW (OUTPATIENT)
Dept: URBAN - METROPOLITAN AREA MEDICAL CENTER 43 | Facility: MEDICAL CENTER | Age: 68
End: 2023-07-13
Payer: MEDICARE

## 2023-07-13 DIAGNOSIS — K64.1 BLEEDING GRADE II HEMORRHOIDS: ICD-10-CM

## 2023-07-13 DIAGNOSIS — D12.0 ADENOMA OF CECUM: ICD-10-CM

## 2023-07-13 DIAGNOSIS — Z12.11 COLON CANCER SCREENING: ICD-10-CM

## 2023-07-13 PROCEDURE — 45385 COLONOSCOPY W/LESION REMOVAL: CPT | Performed by: INTERNAL MEDICINE

## 2023-07-13 RX ORDER — DEXLANSOPRAZOLE 60 MG/1
CAPSULE, DELAYED RELEASE ORAL
Status: ACTIVE | COMMUNITY

## 2023-07-13 RX ORDER — ATORVASTATIN CALCIUM 40 MG/1
TABLET, FILM COATED ORAL
Qty: 30 | Refills: 0 | Status: ON HOLD | COMMUNITY
Start: 2012-05-17

## 2023-07-13 RX ORDER — CLOPIDOGREL 75 MG/1
1 TABLET TABLET, FILM COATED ORAL
Status: ON HOLD | COMMUNITY

## 2023-07-13 RX ORDER — HYDROCODONE BITARTRATE AND ACETAMINOPHEN 7.5; 325 MG/1; MG/1
1 TABLET AS NEEDED TABLET ORAL
Status: ON HOLD | COMMUNITY

## 2023-07-13 RX ORDER — MUPIROCIN 20 MG/G
OINTMENT TOPICAL
Qty: 22 | Refills: 0 | Status: ON HOLD | COMMUNITY
Start: 2012-02-08

## 2023-07-13 RX ORDER — PANTOPRAZOLE SODIUM 40 MG/1
TAKE ONE TABLET BY MOUTH TWICE DAILY TABLET, DELAYED RELEASE ORAL
Qty: 60 TABLET | Refills: 3 | Status: ACTIVE | COMMUNITY

## 2023-07-13 RX ORDER — HYDROCODONE BITARTRATE AND ACETAMINOPHEN 7.5; 325 MG/1; MG/1
1 TABLET AS NEEDED TABLET ORAL
Status: ACTIVE | COMMUNITY

## 2023-07-13 RX ORDER — FENOFIBRATE 160 MG/1
TABLET ORAL
Qty: 30 | Refills: 0 | Status: ON HOLD | COMMUNITY
Start: 2012-02-08

## 2023-07-13 RX ORDER — CLOPIDOGREL 75 MG/1
1 TABLET TABLET, FILM COATED ORAL ONCE A DAY
Status: ACTIVE | COMMUNITY

## 2023-08-31 ENCOUNTER — DASHBOARD ENCOUNTERS (OUTPATIENT)
Age: 68
End: 2023-08-31

## 2023-08-31 ENCOUNTER — OFFICE VISIT (OUTPATIENT)
Dept: URBAN - METROPOLITAN AREA CLINIC 113 | Facility: CLINIC | Age: 68
End: 2023-08-31

## 2023-08-31 RX ORDER — CLOPIDOGREL 75 MG/1
1 TABLET TABLET, FILM COATED ORAL
Status: ON HOLD | COMMUNITY

## 2023-08-31 RX ORDER — FENOFIBRATE 160 MG/1
TABLET ORAL
Qty: 30 | Refills: 0 | Status: ON HOLD | COMMUNITY
Start: 2012-02-08

## 2023-08-31 RX ORDER — HYDROCODONE BITARTRATE AND ACETAMINOPHEN 7.5; 325 MG/1; MG/1
1 TABLET AS NEEDED TABLET ORAL
Status: ON HOLD | COMMUNITY

## 2023-08-31 RX ORDER — DEXLANSOPRAZOLE 60 MG/1
CAPSULE, DELAYED RELEASE ORAL
Status: ACTIVE | COMMUNITY

## 2023-08-31 RX ORDER — ATORVASTATIN CALCIUM 40 MG/1
TABLET, FILM COATED ORAL
Qty: 30 | Refills: 0 | Status: ON HOLD | COMMUNITY
Start: 2012-05-17

## 2023-08-31 RX ORDER — CLOPIDOGREL 75 MG/1
1 TABLET TABLET, FILM COATED ORAL ONCE A DAY
Status: ACTIVE | COMMUNITY

## 2023-08-31 RX ORDER — PANTOPRAZOLE SODIUM 40 MG/1
TAKE ONE TABLET BY MOUTH TWICE DAILY TABLET, DELAYED RELEASE ORAL
Qty: 60 TABLET | Refills: 3 | Status: ACTIVE | COMMUNITY

## 2023-08-31 RX ORDER — MUPIROCIN 20 MG/G
OINTMENT TOPICAL
Qty: 22 | Refills: 0 | Status: ON HOLD | COMMUNITY
Start: 2012-02-08

## 2023-08-31 RX ORDER — HYDROCODONE BITARTRATE AND ACETAMINOPHEN 7.5; 325 MG/1; MG/1
1 TABLET AS NEEDED TABLET ORAL
Status: ACTIVE | COMMUNITY

## 2023-08-31 NOTE — HPI-TODAY'S VISIT:
67-year-old male presents for follow-up after screening colonoscopy.  He was last seen on 7/3/2023.  Regarding his cholelithiasis, surgery was not warranted. Colonoscopy 7/13/2023:Grade 2 nonbleeding internal hemorrhoids.  Removal of  two 6-8mm polyps in the sigmoid colon and cecum.  Pathology revealed a tubular adenoma and vegetable matter.  Distal rectum and anal verge are normal.  Repeat in 5 years for surveillance.   7/3/2023 66 y/o male presenting for f/u. He was last seen in May 2023. He has a hx of GERD and GI bleeding. He had an EGD for dysphagia in June 2022 which was normal. He has had stress test and eval with cards which is normal. This was done with Dr. Rodriges. He has had f/u with vascular surgery for bypass towards the end of this month. He has never had a CSC in the past. We will schedule this for after his LE bypass.   5/11/23 Review 67-year-old male presents for short interval follow-up.  He was last seen on 4/3/2023.  He is due for screening colonoscopy however this was originally deferred due to prior CVA in 2022.  He had since been hospitalized for popliteal artery clotting followed by retroperitoneal hematoma status postembolization.  This was complicated by NSTEMI and ALBAN.  Given his recent cardiovascular events he was again referred for colonoscopy for at least 3 to 6 months.  Cardiology follow-up was encouraged.  CT angiogram was notable for pancreatic lesion.  Given his unintentional weight loss and findings he was planned for an MRI.  Based on these findings would consider upper GI series.  EGD again is not recommended given comorbidities.  Labs 4/3/2023:Normal sed rate, CRP 19.4, potassium 3, normal LFTs, unremarkable CBC.  MRI of the abdomen with and without contrast 4/18/2023:No evidence for pancreatic mass.  Cholelithiasis with mild gallbladder wall thickening measuring just over 3 mm and mild dilatation of the biliary tree without evidence of choledocholithiasis.  Recommend correlation with liver function test.  There is a 99 x 72 mm complex collection in the left lower retroperitoneum lateral to the psoas muscle.  It is T1 shortening with no evidence of enhancement is most consistent with a hematoma.  Correlate clinically.  Small right pleural effusion.  Mild enlargement of the infrarenal aorta measuring 31 mm.  He was recommended to follow-up with his primary care regarding the large hematoma.  Regarding his gallstone findings, most recent LFTs were normal though he was asked if he had any right upper quadrant pain.  He admitted to intermittent right upper quadrant pain sometimes associated with food.  He was referred to Dr. Lopez to determine candidacy for surgery.   He is drinking boost shakes 4-5 times per day. He was switched from Paxil to Lexapro. He did have nausea during today's visit. He is not sure if this is from Paxil. He has not had nausea issues his last visit. He denies abdominal pain. Denies diarrhea or rectal bleeding. His appetite is healthy. He is not sure why he is not gaining weight. He did see Dr. Lopez yesterday who did not feel he was a candidate for surgery at this time.  4/3/2023 67-year-old male presents for follow-up.  He was last seen on 8/22/2022.  EGD performed for worsening GERD and intermittent dysphagia was unremarkable with exception of chronic inactive gastritis.  Symptoms had resolved with consistent use of Dexilant 60 mg once daily.  He was to continue this.  He was overdue for colon cancer screening as he had never had a colonoscopy.  However, he did recently have a stroke and a colonoscopy was deferred until least 6 months.  We would rediscuss at follow-up.   He presented to the ED in January 2023 with acute occlusion of the left popliteal artery and underwent angiographic thrombectomy on 1/20/2023.  He developed hypotension/shock postoperatively and a CT angiogram showed a large left retroperitoneal hematoma with active bleeding along the iliac muscle arising from the left deep circumflex iliac.  IR performed emergent coil embolization of the left deep circumflex iliac artery with successful cessation of the hemorrhage..  He required massive transfusion protocol 5 units of PRBC.  He developed a NSTEMI, ALBAN and acute hypoxic respiratory failure during the event.  Cardiology in consultation elected to perform heart catheterization which turned out to be absent of new disease findings.  CT angiogram abdomen/pelvis 1/202/2023: normal liver, gallbladder, and spleen. Focal 1.3 x 0.9 cm hyperenhancing focus within the distal pancreatic tail. Small hiatal hernia. Large left retroperitoneal hematoma with active hemorrhage along the ilias muscle, felt to arise from the branches of the left deep circumflex iliac artery. Diffuse aortoiliac atherosclerosis with severe stenosis of the distal left external iliac/proximal left common femoral artery just distal to endograft. There is complete reconstitution just distally at the common femoral artery stent. Infrarenal abdominal aortic aneurysm measuring up to 3 cm stable from prior exam. Tree-in-bud nodularity within the right lower love with endobronchial debris, new from prior exam, which may reflect chronic infectious/inflammatory process and/or aspiration.   He is followed by Dr. Thomas. He has not seen him since the hospitalization. He has 20-15 pounds since his hospitalization. His appetite is normal. He eats two meals per day, yet he is losing weight.  He is followed by pain management. He is to be started on a prescription for his opioid induced constipation. He is waiting for the prescription. He denies abdominal pain, nausea or vomiting. Denies fevers or chills. Denies blood per rectum or melena. Reflux is well controlled Dexilant 60 mg once daily.   8/22/22 66-year-old male with a history of GERD, hyperlipidemia, and anticoagulation use presents for follow-up after EGD.  He was last seen on 5/24/2022 as a referral from Dr. William Mireles for GERD.  He reported worsening GERD over the past year despite as needed use of Dexilant.  Symptoms worsened at night, and he experienced occasional dysphagia following his neck surgery.  He was planned for an EGD with possible dilation and started on pantoprazole 40 mg twice daily.  He was asked to hold Plavix 7 days prior to procedure.  EGD 6/8/2022:Performed without difficulty.  No endoscopic esophageal abnormality to explain patient's dysphagia.  Duodenum was normal.  Gastritis was biopsied.  Pathology chronic inactive gastritis with changes suggestive of treated H pylori gastritis. Foveolar hyperplasia. No evidence of active H pylori.  Patient was diagnosed with left MCA CVA s/p emergent thrombectomy. He was started on ASA/Plavix and underwent ST/OT. His cardiologist is Dr. Thomas. Dysphagia and GERD is well controlled with Dexilant 60 mg once daily. He denies nausea, vomiting, abdominal pain. Bowel movements regular and without blood per rectum. He has never had a colonoscopy. No family history of colon cancer.

## 2023-09-12 ENCOUNTER — OFFICE VISIT (OUTPATIENT)
Dept: URBAN - METROPOLITAN AREA CLINIC 113 | Facility: CLINIC | Age: 68
End: 2023-09-12

## 2023-10-26 ENCOUNTER — CLAIMS CREATED FROM THE CLAIM WINDOW (OUTPATIENT)
Dept: URBAN - METROPOLITAN AREA MEDICAL CENTER 43 | Facility: MEDICAL CENTER | Age: 68
End: 2023-10-26
Payer: MEDICARE

## 2023-10-26 DIAGNOSIS — R74.01 ABNORMAL/ELEVATED TRANSAMINASE (SGOT, AMINOTRANSFERASE): ICD-10-CM

## 2023-10-26 DIAGNOSIS — R79.89 ABNORMAL BILIRUBIN TEST: ICD-10-CM

## 2023-10-26 DIAGNOSIS — R62.51 FAILURE TO THRIVE: ICD-10-CM

## 2023-10-26 DIAGNOSIS — K81.1 CHRONIC CHOLECYSTITIS: ICD-10-CM

## 2023-10-26 PROCEDURE — 99222 1ST HOSP IP/OBS MODERATE 55: CPT | Performed by: INTERNAL MEDICINE

## 2023-10-27 ENCOUNTER — CLAIMS CREATED FROM THE CLAIM WINDOW (OUTPATIENT)
Dept: URBAN - METROPOLITAN AREA MEDICAL CENTER 43 | Facility: MEDICAL CENTER | Age: 68
End: 2023-10-27
Payer: MEDICARE

## 2023-10-27 DIAGNOSIS — R74.01 ABNORMAL/ELEVATED TRANSAMINASE (SGOT, AMINOTRANSFERASE): ICD-10-CM

## 2023-10-27 DIAGNOSIS — R62.51 FAILURE TO THRIVE: ICD-10-CM

## 2023-10-27 DIAGNOSIS — K81.1 CHRONIC CHOLECYSTITIS: ICD-10-CM

## 2023-10-27 DIAGNOSIS — R79.89 ABNORMAL BILIRUBIN TEST: ICD-10-CM

## 2023-10-27 PROCEDURE — 99232 SBSQ HOSP IP/OBS MODERATE 35: CPT | Performed by: PHYSICIAN ASSISTANT

## 2023-10-28 ENCOUNTER — CLAIMS CREATED FROM THE CLAIM WINDOW (OUTPATIENT)
Dept: URBAN - METROPOLITAN AREA MEDICAL CENTER 43 | Facility: MEDICAL CENTER | Age: 68
End: 2023-10-28
Payer: MEDICARE

## 2023-10-28 DIAGNOSIS — R79.89 ABNORMAL BILIRUBIN TEST: ICD-10-CM

## 2023-10-28 DIAGNOSIS — K81.1 CHRONIC CHOLECYSTITIS: ICD-10-CM

## 2023-10-28 DIAGNOSIS — R62.51 FAILURE TO THRIVE: ICD-10-CM

## 2023-10-28 DIAGNOSIS — R74.01 ABNORMAL/ELEVATED TRANSAMINASE (SGOT, AMINOTRANSFERASE): ICD-10-CM

## 2023-10-28 PROCEDURE — 99233 SBSQ HOSP IP/OBS HIGH 50: CPT | Performed by: INTERNAL MEDICINE

## 2023-10-29 ENCOUNTER — CLAIMS CREATED FROM THE CLAIM WINDOW (OUTPATIENT)
Dept: URBAN - METROPOLITAN AREA MEDICAL CENTER 43 | Facility: MEDICAL CENTER | Age: 68
End: 2023-10-29
Payer: MEDICARE

## 2023-10-29 DIAGNOSIS — R79.89 ABNORMAL BILIRUBIN TEST: ICD-10-CM

## 2023-10-29 DIAGNOSIS — R62.51 FAILURE TO THRIVE: ICD-10-CM

## 2023-10-29 DIAGNOSIS — K81.1 CHRONIC CHOLECYSTITIS: ICD-10-CM

## 2023-10-29 DIAGNOSIS — R74.01 ABNORMAL/ELEVATED TRANSAMINASE (SGOT, AMINOTRANSFERASE): ICD-10-CM

## 2023-10-29 PROCEDURE — 99232 SBSQ HOSP IP/OBS MODERATE 35: CPT | Performed by: INTERNAL MEDICINE

## 2023-10-30 ENCOUNTER — CLAIMS CREATED FROM THE CLAIM WINDOW (OUTPATIENT)
Dept: URBAN - METROPOLITAN AREA MEDICAL CENTER 43 | Facility: MEDICAL CENTER | Age: 68
End: 2023-10-30
Payer: MEDICARE

## 2023-10-30 DIAGNOSIS — R74.01 ABNORMAL/ELEVATED TRANSAMINASE (SGOT, AMINOTRANSFERASE): ICD-10-CM

## 2023-10-30 DIAGNOSIS — K81.1 CHRONIC CHOLECYSTITIS: ICD-10-CM

## 2023-10-30 DIAGNOSIS — R13.19 CERVICAL DYSPHAGIA: ICD-10-CM

## 2023-10-30 DIAGNOSIS — R62.51 FAILURE TO THRIVE: ICD-10-CM

## 2023-10-30 PROCEDURE — 99232 SBSQ HOSP IP/OBS MODERATE 35: CPT | Performed by: INTERNAL MEDICINE

## 2023-10-31 ENCOUNTER — CLAIMS CREATED FROM THE CLAIM WINDOW (OUTPATIENT)
Dept: URBAN - METROPOLITAN AREA MEDICAL CENTER 43 | Facility: MEDICAL CENTER | Age: 68
End: 2023-10-31
Payer: MEDICARE

## 2023-10-31 DIAGNOSIS — R13.19 CERVICAL DYSPHAGIA: ICD-10-CM

## 2023-10-31 PROCEDURE — 43235 EGD DIAGNOSTIC BRUSH WASH: CPT | Performed by: INTERNAL MEDICINE
